# Patient Record
Sex: MALE | URBAN - METROPOLITAN AREA
[De-identification: names, ages, dates, MRNs, and addresses within clinical notes are randomized per-mention and may not be internally consistent; named-entity substitution may affect disease eponyms.]

---

## 2022-05-13 ENCOUNTER — OFFICE VISIT (OUTPATIENT)
Dept: URBAN - METROPOLITAN AREA CLINIC 113 | Facility: CLINIC | Age: 52
End: 2022-05-13
Payer: SELF-PAY

## 2022-05-13 ENCOUNTER — LAB OUTSIDE AN ENCOUNTER (OUTPATIENT)
Dept: URBAN - METROPOLITAN AREA CLINIC 113 | Facility: CLINIC | Age: 52
End: 2022-05-13

## 2022-05-13 VITALS
BODY MASS INDEX: 27.11 KG/M2 | SYSTOLIC BLOOD PRESSURE: 127 MMHG | TEMPERATURE: 97.8 F | WEIGHT: 183 LBS | DIASTOLIC BLOOD PRESSURE: 81 MMHG | HEIGHT: 69 IN | HEART RATE: 53 BPM

## 2022-05-13 DIAGNOSIS — Z80.0 FAMILY HISTORY OF STOMACH CANCER: ICD-10-CM

## 2022-05-13 DIAGNOSIS — R10.13 EPIGASTRIC PAIN: ICD-10-CM

## 2022-05-13 PROCEDURE — 99244 OFF/OP CNSLTJ NEW/EST MOD 40: CPT | Performed by: INTERNAL MEDICINE

## 2022-05-13 PROCEDURE — 99204 OFFICE O/P NEW MOD 45 MIN: CPT | Performed by: INTERNAL MEDICINE

## 2022-05-13 RX ORDER — PANTOPRAZOLE SODIUM 40 MG/1
1 TABLET TABLET, DELAYED RELEASE ORAL ONCE A DAY
Status: ON HOLD | COMMUNITY

## 2022-05-13 RX ORDER — PANTOPRAZOLE SODIUM 40 MG/1
1 TABLET TABLET, DELAYED RELEASE ORAL ONCE A DAY
Qty: 90 TABLET | Refills: 2 | OUTPATIENT
Start: 2022-05-13

## 2022-05-13 RX ORDER — DEXTROMETHORPHAN HYDROBROMIDE AND PROMETHAZINE HYDROCHLORIDE 15; 6.25 MG/5ML; MG/5ML
5 ML AS NEEDED SYRUP ORAL
Status: ON HOLD | COMMUNITY

## 2022-05-13 NOTE — HPI-TODAY'S VISIT:
52 yo referred by Dr. Travis Oviedo for epigastric abdominal pain. A copy of today's visit will be forwarded to the referring provider.  Labs 12/22/21: PSA 0.61, A1c 5.3, TSH 2.249, WBC 6.47, H/H 14.5/43.5, MCV 91.8, Plt 172, Chol 199, Trig 100, HDL 59, , BUN 20, Crt 1.1, Na 138. K 3.9, Tbili 0.9, AST 25, ALT 40, ALP 67.  Abdominal US on 12/29/21 revealed a normal pancreas, a 1.4 cm echogenic mass in the left hepatic lobe likely representing a benign hemangioma and a 1.2 cm hepatic cyst in the right hepatic lobe, normal appearing gall bladder.  He is Wolof speaking. A  was offered to the patient, however, he prefers to use his [place] in [place] as his .   He tells me that he has been having abdominal cramping, and a sensation of chronic abdominal bubbling. This can occur on both an empty stomach or full stomach. This is not really painful as much as it is annoying. He tells me that the stomach cramping occurs 2 or 3 times per week, usually after the dinner time meal. There is no heartburn or dysphagia. HE describes a sensation of a foul taste in the mouth in the mornings. No nausea or vomiting. He stopped pantoprazole last week due to exhausting his supply. He did notice mild improvement in his abdominal complaints with pantoprazole. He has bowel movements usually once daily. There is no red blood per rectum or melena. He can have some bloating, which can imprve with passing gas or having a bowel movement. There has been no weight loss. There is not any early satiety. He has noted that eating pasta and saucy foods make him feel more bloated than others.

## 2022-05-20 ENCOUNTER — OFFICE VISIT (OUTPATIENT)
Dept: URBAN - METROPOLITAN AREA SURGERY CENTER 25 | Facility: SURGERY CENTER | Age: 52
End: 2022-05-20

## 2022-05-20 ENCOUNTER — CLAIMS CREATED FROM THE CLAIM WINDOW (OUTPATIENT)
Dept: URBAN - METROPOLITAN AREA SURGERY CENTER 25 | Facility: SURGERY CENTER | Age: 52
End: 2022-05-20
Payer: SELF-PAY

## 2022-05-20 ENCOUNTER — CLAIMS CREATED FROM THE CLAIM WINDOW (OUTPATIENT)
Dept: URBAN - METROPOLITAN AREA CLINIC 4 | Facility: CLINIC | Age: 52
End: 2022-05-20
Payer: SELF-PAY

## 2022-05-20 DIAGNOSIS — Z80.0: ICD-10-CM

## 2022-05-20 DIAGNOSIS — B96.81 HELICOBACTER PYLORI [H. PYLORI] AS THE CAUSE OF DISEASES CLASSIFIED ELSEWHERE: ICD-10-CM

## 2022-05-20 DIAGNOSIS — B96.81 H PYLORI +: ICD-10-CM

## 2022-05-20 DIAGNOSIS — K29.60 OTHER GASTRITIS WITHOUT HEMORRHAGE, UNSPECIFIED CHRONICITY: ICD-10-CM

## 2022-05-20 DIAGNOSIS — K29.60 OTHER GASTRITIS WITHOUT BLEEDING: ICD-10-CM

## 2022-05-20 PROCEDURE — 43239 EGD BIOPSY SINGLE/MULTIPLE: CPT | Performed by: INTERNAL MEDICINE

## 2022-05-20 PROCEDURE — 88305 TISSUE EXAM BY PATHOLOGIST: CPT | Performed by: PATHOLOGY

## 2022-05-20 PROCEDURE — 88312 SPECIAL STAINS GROUP 1: CPT | Performed by: PATHOLOGY

## 2022-05-20 PROCEDURE — G8907 PT DOC NO EVENTS ON DISCHARG: HCPCS | Performed by: INTERNAL MEDICINE

## 2022-05-20 RX ORDER — DEXTROMETHORPHAN HYDROBROMIDE AND PROMETHAZINE HYDROCHLORIDE 15; 6.25 MG/5ML; MG/5ML
5 ML AS NEEDED SYRUP ORAL
Status: ON HOLD | COMMUNITY

## 2022-05-20 RX ORDER — PANTOPRAZOLE SODIUM 40 MG/1
1 TABLET TABLET, DELAYED RELEASE ORAL ONCE A DAY
Status: ON HOLD | COMMUNITY

## 2022-05-20 RX ORDER — PANTOPRAZOLE SODIUM 40 MG/1
1 TABLET TABLET, DELAYED RELEASE ORAL ONCE A DAY
Qty: 90 TABLET | Refills: 2 | Status: ACTIVE | COMMUNITY
Start: 2022-05-13

## 2022-07-01 ENCOUNTER — OFFICE VISIT (OUTPATIENT)
Dept: URBAN - METROPOLITAN AREA CLINIC 113 | Facility: CLINIC | Age: 52
End: 2022-07-01
Payer: SELF-PAY

## 2022-07-01 VITALS
SYSTOLIC BLOOD PRESSURE: 127 MMHG | WEIGHT: 184 LBS | DIASTOLIC BLOOD PRESSURE: 76 MMHG | HEIGHT: 69 IN | HEART RATE: 54 BPM | TEMPERATURE: 97.8 F | BODY MASS INDEX: 27.25 KG/M2

## 2022-07-01 DIAGNOSIS — A04.8 H. PYLORI INFECTION: ICD-10-CM

## 2022-07-01 DIAGNOSIS — Z80.0 FAMILY HISTORY OF STOMACH CANCER: ICD-10-CM

## 2022-07-01 DIAGNOSIS — R10.13 EPIGASTRIC PAIN: ICD-10-CM

## 2022-07-01 PROCEDURE — 99213 OFFICE O/P EST LOW 20 MIN: CPT | Performed by: NURSE PRACTITIONER

## 2022-07-01 RX ORDER — METRONIDAZOLE 500 MG/1
1 TABLET TABLET ORAL TWICE A DAY
Qty: 28 TABLET | Refills: 0 | OUTPATIENT
Start: 2022-07-01 | End: 2022-07-15

## 2022-07-01 RX ORDER — DEXTROMETHORPHAN HYDROBROMIDE AND PROMETHAZINE HYDROCHLORIDE 15; 6.25 MG/5ML; MG/5ML
5 ML AS NEEDED SYRUP ORAL
Status: ON HOLD | COMMUNITY

## 2022-07-01 RX ORDER — BISMUTH SUBSALICYLATE 262 MG/1
2 TABLETS TABLET, CHEWABLE ORAL TWICE DAILY
Qty: 56 | Refills: 0 | OUTPATIENT
Start: 2022-07-01 | End: 2022-07-15

## 2022-07-01 RX ORDER — PANTOPRAZOLE SODIUM 40 MG/1
1 TABLET TABLET, DELAYED RELEASE ORAL ONCE A DAY
Status: ON HOLD | COMMUNITY

## 2022-07-01 RX ORDER — DOXYCYCLINE HYCLATE 100 MG/1
1 TABLET CAPSULE, GELATIN COATED ORAL BID
Qty: 28 TABLET | Refills: 0 | OUTPATIENT
Start: 2022-07-01 | End: 2022-07-15

## 2022-07-01 RX ORDER — PANTOPRAZOLE SODIUM 40 MG/1
1 TABLET TABLET, DELAYED RELEASE ORAL ONCE A DAY
Qty: 90 TABLET | Refills: 2 | Status: ACTIVE | COMMUNITY
Start: 2022-05-13

## 2022-07-01 RX ORDER — PANTOPRAZOLE SODIUM 40 MG/1
1 TABLET TABLET, DELAYED RELEASE ORAL TWICE DAILY
Qty: 28 | Refills: 0 | OUTPATIENT
Start: 2022-07-01

## 2022-07-01 NOTE — HPI-TODAY'S VISIT:
50 yo male with epigastric pain characterized as a bubbling sensation in the abdomine and bloating, in the setting of family history of stomach cancer in his mother and maternal uncle, presenting for follow up after an EGD.  EGD 5/20/22: Normal esophagus, regular Z line 40 cm from the incisors, erythematous gastric mucosa s/p biopsies, and normal examined duodenum. Stomach biopsies revealed chronic H. pylori gastritis with identification of H. pylori organisms; no intestinal metaplasia, dysplasia or malignancy.

## 2022-10-07 ENCOUNTER — OFFICE VISIT (OUTPATIENT)
Dept: URBAN - METROPOLITAN AREA CLINIC 113 | Facility: CLINIC | Age: 52
End: 2022-10-07
Payer: SELF-PAY

## 2022-10-07 VITALS
HEART RATE: 51 BPM | TEMPERATURE: 97.2 F | HEIGHT: 69 IN | SYSTOLIC BLOOD PRESSURE: 128 MMHG | BODY MASS INDEX: 26.07 KG/M2 | WEIGHT: 176 LBS | DIASTOLIC BLOOD PRESSURE: 80 MMHG

## 2022-10-07 DIAGNOSIS — R14.3 EXCESSIVE GAS: ICD-10-CM

## 2022-10-07 DIAGNOSIS — A04.8 H. PYLORI INFECTION: ICD-10-CM

## 2022-10-07 DIAGNOSIS — Z12.11 SCREENING FOR COLON CANCER: ICD-10-CM

## 2022-10-07 PROCEDURE — 99214 OFFICE O/P EST MOD 30 MIN: CPT | Performed by: NURSE PRACTITIONER

## 2022-10-07 RX ORDER — PANTOPRAZOLE SODIUM 40 MG/1
1 TABLET TABLET, DELAYED RELEASE ORAL TWICE DAILY
Qty: 28 | Refills: 0 | Status: ON HOLD | COMMUNITY
Start: 2022-07-01

## 2022-10-07 RX ORDER — PANTOPRAZOLE SODIUM 40 MG/1
1 TABLET TABLET, DELAYED RELEASE ORAL ONCE A DAY
Status: ON HOLD | COMMUNITY

## 2022-10-07 RX ORDER — PANTOPRAZOLE SODIUM 40 MG/1
1 TABLET TABLET, DELAYED RELEASE ORAL ONCE A DAY
Qty: 90 TABLET | Refills: 2 | Status: ON HOLD | COMMUNITY
Start: 2022-05-13

## 2022-10-07 RX ORDER — DEXTROMETHORPHAN HYDROBROMIDE AND PROMETHAZINE HYDROCHLORIDE 15; 6.25 MG/5ML; MG/5ML
5 ML AS NEEDED SYRUP ORAL
Status: ON HOLD | COMMUNITY

## 2022-10-07 NOTE — HPI-TODAY'S VISIT:
50yo male presenting for follow-up regarding H pylori. A Occitan  was utilized for the visit.  He was seen in the office in July for follow-up after EGD, which revealed H pylori gastritis. He was recommended treatment with quadruple therapy with doxycycline, metronidazole, bismuth, and pantoprazole.  He completed the antibiotics as prescribed. He continues to experience intermittent exacerbations of midabdominal gas and cramping, which is worsened following consumption of some foods- particularly cheese. The pain is not associated with bowel movements. He denies reflux symptoms, nausea or vomiting. He has two soft, nonbloody stools per day. No constipation or diarrhea. He is not on any medications currently. He has never undergone colon cancer screening with colonoscopy or stool testing. There is no family history of colon cancer.

## 2022-10-07 NOTE — HPI-OTHER HISTORIES
EGD 5/20/22: Normal esophagus, regular Z line 40 cm from the incisors, erythematous gastric mucosa s/p biopsies, and normal examined duodenum. Stomach biopsies revealed chronic H. pylori gastritis with identification of H. pylori organisms; no intestinal metaplasia, dysplasia or malignancy. Abdominal US on 12/29/21 revealed a normal pancreas, a 1.4 cm echogenic mass in the left hepatic lobe likely representing a benign hemangioma and a 1.2 cm hepatic cyst in the right hepatic lobe, normal appearing gallbladder.  Labs 12/22/21: PSA 0.61, A1c 5.3, TSH 2.249, WBC 6.47, H/H 14.5/43.5, MCV 91.8, Plt 172, Chol 199, Trig 100, HDL 59, , BUN 20, Crt 1.1, Na 138. K 3.9, Tbili 0.9, AST 25, ALT 40, ALP 67.

## 2022-10-08 LAB
H PYLORI BREATH TEST: DETECTED
H. PYLORI BREATH TEST: DETECTED
INTERPRETATION: DETECTED

## 2022-10-10 ENCOUNTER — TELEPHONE ENCOUNTER (OUTPATIENT)
Dept: URBAN - METROPOLITAN AREA CLINIC 113 | Facility: CLINIC | Age: 52
End: 2022-10-10

## 2022-10-10 RX ORDER — LANSOPRAZOLE 30 MG/1
1 CAPSULE 30 MINUTES PRIOR TO BREAKFAST AND DINNER CAPSULE, DELAYED RELEASE ORAL TWICE DAILY
Qty: 28 CAPSULES | Refills: 0 | OUTPATIENT

## 2022-10-10 RX ORDER — OMEPRAZOLE MAGNESIUM, AMOXICILLIN AND RIFABUTIN 10; 250; 12.5 MG/1; MG/1; MG/1
4 CAPSULES CAPSULE, DELAYED RELEASE ORAL
Qty: 168 | OUTPATIENT
Start: 2022-10-10 | End: 2022-10-24

## 2022-10-10 RX ORDER — AMOXICILLIN 500 MG/1
2 CAPSULES CAPSULE ORAL TWICE A DAY
Qty: 56 CAPSULE | Refills: 0 | OUTPATIENT
Start: 2022-10-19 | End: 2022-11-02

## 2022-10-10 RX ORDER — CLARITHROMYCIN 500 MG/1
1 TABLET TABLET, FILM COATED ORAL
Qty: 28 TABLET | Refills: 0 | OUTPATIENT
Start: 2022-10-19 | End: 2022-11-02

## 2022-11-28 ENCOUNTER — WEB ENCOUNTER (OUTPATIENT)
Dept: URBAN - METROPOLITAN AREA CLINIC 113 | Facility: CLINIC | Age: 52
End: 2022-11-28

## 2022-11-28 ENCOUNTER — OFFICE VISIT (OUTPATIENT)
Dept: URBAN - METROPOLITAN AREA CLINIC 113 | Facility: CLINIC | Age: 52
End: 2022-11-28
Payer: SELF-PAY

## 2022-11-28 VITALS
SYSTOLIC BLOOD PRESSURE: 151 MMHG | DIASTOLIC BLOOD PRESSURE: 81 MMHG | WEIGHT: 176 LBS | RESPIRATION RATE: 16 BRPM | HEIGHT: 69 IN | TEMPERATURE: 97.7 F | HEART RATE: 67 BPM | BODY MASS INDEX: 26.07 KG/M2

## 2022-11-28 DIAGNOSIS — A04.8 H. PYLORI INFECTION: ICD-10-CM

## 2022-11-28 DIAGNOSIS — R10.31 BILATERAL LOWER ABDOMINAL PAIN: ICD-10-CM

## 2022-11-28 DIAGNOSIS — Z12.11 SCREENING FOR COLON CANCER: ICD-10-CM

## 2022-11-28 PROCEDURE — 99214 OFFICE O/P EST MOD 30 MIN: CPT | Performed by: NURSE PRACTITIONER

## 2022-11-28 RX ORDER — PANTOPRAZOLE SODIUM 40 MG/1
1 TABLET TABLET, DELAYED RELEASE ORAL ONCE A DAY
Qty: 90 TABLET | Refills: 2 | Status: ON HOLD | COMMUNITY
Start: 2022-05-13

## 2022-11-28 RX ORDER — PANTOPRAZOLE SODIUM 40 MG/1
1 TABLET TABLET, DELAYED RELEASE ORAL TWICE DAILY
Qty: 28 | Refills: 0 | Status: ON HOLD | COMMUNITY
Start: 2022-07-01

## 2022-11-28 RX ORDER — DICYCLOMINE HYDROCHLORIDE 10 MG/1
1 CAPSULE CAPSULE ORAL
Qty: 60 | Refills: 1 | OUTPATIENT
Start: 2022-11-28 | End: 2023-01-27

## 2022-11-28 RX ORDER — DEXTROMETHORPHAN HYDROBROMIDE AND PROMETHAZINE HYDROCHLORIDE 15; 6.25 MG/5ML; MG/5ML
5 ML AS NEEDED SYRUP ORAL
Status: ON HOLD | COMMUNITY

## 2022-11-28 RX ORDER — LANSOPRAZOLE 30 MG/1
1 CAPSULE 30 MINUTES PRIOR TO BREAKFAST AND DINNER CAPSULE, DELAYED RELEASE ORAL TWICE DAILY
Qty: 28 CAPSULES | Refills: 0 | Status: ACTIVE | COMMUNITY

## 2022-11-28 RX ORDER — PANTOPRAZOLE SODIUM 40 MG/1
1 TABLET TABLET, DELAYED RELEASE ORAL ONCE A DAY
Status: ON HOLD | COMMUNITY

## 2022-11-28 NOTE — HPI-TODAY'S VISIT:
52yo male with a history of H pylori presenting for follow-up. A Armenian  was utilized for the visit.  He was seen in the office 10/7/22 for follow-up regarding H pylori s/p treatment with quadruple therapy with doxycycline, metronidazole, bismuth, and pantoprazole. A H pylori breath test was planned to document eradication. Regarding excess gas, suspected to be related to lactose intolerance, he was encouraged a daily probiotic and dairy holiday. Stool DNA testing was discussed for colon cancer screening, given financial barriers related to lack of medical coverage. H pylori breath test 10/7/22 was positive. He was recommended treatment with Talicia, but the medication was cost-prohibitive. He was sent in [facility], alternatively. He completed the antibiotics earlier this month. After some consideration, he has elected to proceed with a colonoscopy for colon cancer screening opposed to stool DNA testing. He complains of occasional cramping lower abdominal pain, excess gas and stomach "grumbling," which is not related to meals but seems to be worsened at night. He minimized some dairy but continues to drink milk; he does not think this is related. He denies associated nausea or vomiting. He has two nonbloody stools per day.

## 2022-12-20 ENCOUNTER — TELEPHONE ENCOUNTER (OUTPATIENT)
Dept: URBAN - METROPOLITAN AREA CLINIC 113 | Facility: CLINIC | Age: 52
End: 2022-12-20

## 2022-12-21 ENCOUNTER — OFFICE VISIT (OUTPATIENT)
Dept: URBAN - METROPOLITAN AREA SURGERY CENTER 25 | Facility: SURGERY CENTER | Age: 52
End: 2022-12-21

## 2023-01-05 ENCOUNTER — OFFICE VISIT (OUTPATIENT)
Dept: URBAN - METROPOLITAN AREA CLINIC 113 | Facility: CLINIC | Age: 53
End: 2023-01-05

## 2023-01-18 ENCOUNTER — CLAIMS CREATED FROM THE CLAIM WINDOW (OUTPATIENT)
Dept: URBAN - METROPOLITAN AREA CLINIC 4 | Facility: CLINIC | Age: 53
End: 2023-01-18
Payer: SELF-PAY

## 2023-01-18 ENCOUNTER — CLAIMS CREATED FROM THE CLAIM WINDOW (OUTPATIENT)
Dept: URBAN - METROPOLITAN AREA SURGERY CENTER 25 | Facility: SURGERY CENTER | Age: 53
End: 2023-01-18
Payer: SELF-PAY

## 2023-01-18 ENCOUNTER — CLAIMS CREATED FROM THE CLAIM WINDOW (OUTPATIENT)
Dept: URBAN - METROPOLITAN AREA SURGERY CENTER 25 | Facility: SURGERY CENTER | Age: 53
End: 2023-01-18

## 2023-01-18 DIAGNOSIS — Z80.0 FAMILY HISTORY OF STOMACH CANCER: ICD-10-CM

## 2023-01-18 DIAGNOSIS — D12.0 ADENOMA OF CECUM: ICD-10-CM

## 2023-01-18 DIAGNOSIS — D12.3 ADENOMA OF TRANSVERSE COLON: ICD-10-CM

## 2023-01-18 DIAGNOSIS — Z12.11 COLON CANCER SCREENING: ICD-10-CM

## 2023-01-18 DIAGNOSIS — D12.0 BENIGN NEOPLASM OF CECUM: ICD-10-CM

## 2023-01-18 DIAGNOSIS — D12.2 BENIGN NEOPLASM OF ASCENDING COLON: ICD-10-CM

## 2023-01-18 PROCEDURE — 88305 TISSUE EXAM BY PATHOLOGIST: CPT | Performed by: PATHOLOGY

## 2023-01-18 PROCEDURE — 45385 COLONOSCOPY W/LESION REMOVAL: CPT | Performed by: INTERNAL MEDICINE

## 2023-01-18 PROCEDURE — G8907 PT DOC NO EVENTS ON DISCHARG: HCPCS | Performed by: INTERNAL MEDICINE

## 2023-01-18 RX ORDER — PANTOPRAZOLE SODIUM 40 MG/1
1 TABLET TABLET, DELAYED RELEASE ORAL ONCE A DAY
Qty: 90 TABLET | Refills: 2 | Status: ON HOLD | COMMUNITY
Start: 2022-05-13

## 2023-01-18 RX ORDER — DICYCLOMINE HYDROCHLORIDE 10 MG/1
1 CAPSULE CAPSULE ORAL
Qty: 60 | Refills: 1 | Status: ACTIVE | COMMUNITY
Start: 2022-11-28 | End: 2023-01-27

## 2023-01-18 RX ORDER — PANTOPRAZOLE SODIUM 40 MG/1
1 TABLET TABLET, DELAYED RELEASE ORAL ONCE A DAY
Status: ON HOLD | COMMUNITY

## 2023-01-18 RX ORDER — LANSOPRAZOLE 30 MG/1
1 CAPSULE 30 MINUTES PRIOR TO BREAKFAST AND DINNER CAPSULE, DELAYED RELEASE ORAL TWICE DAILY
Qty: 28 CAPSULES | Refills: 0 | Status: ACTIVE | COMMUNITY

## 2023-01-18 RX ORDER — PANTOPRAZOLE SODIUM 40 MG/1
1 TABLET TABLET, DELAYED RELEASE ORAL TWICE DAILY
Qty: 28 | Refills: 0 | Status: ON HOLD | COMMUNITY
Start: 2022-07-01

## 2023-01-18 RX ORDER — DEXTROMETHORPHAN HYDROBROMIDE AND PROMETHAZINE HYDROCHLORIDE 15; 6.25 MG/5ML; MG/5ML
5 ML AS NEEDED SYRUP ORAL
Status: ON HOLD | COMMUNITY

## 2023-02-06 ENCOUNTER — OFFICE VISIT (OUTPATIENT)
Dept: URBAN - METROPOLITAN AREA CLINIC 113 | Facility: CLINIC | Age: 53
End: 2023-02-06
Payer: SELF-PAY

## 2023-02-06 VITALS
DIASTOLIC BLOOD PRESSURE: 94 MMHG | BODY MASS INDEX: 26.66 KG/M2 | HEIGHT: 69 IN | SYSTOLIC BLOOD PRESSURE: 143 MMHG | TEMPERATURE: 97 F | WEIGHT: 180 LBS | RESPIRATION RATE: 55 BRPM

## 2023-02-06 DIAGNOSIS — K57.30 DIVERTICULOSIS LARGE INTESTINE W/O PERFORATION OR ABSCESS W/O BLEEDING: ICD-10-CM

## 2023-02-06 DIAGNOSIS — R10.31 BILATERAL LOWER ABDOMINAL PAIN: ICD-10-CM

## 2023-02-06 DIAGNOSIS — A04.8 H. PYLORI INFECTION: ICD-10-CM

## 2023-02-06 DIAGNOSIS — D12.6 SERRATED ADENOMA OF COLON: ICD-10-CM

## 2023-02-06 PROBLEM — 428054006: Status: ACTIVE | Noted: 2023-02-05

## 2023-02-06 PROBLEM — 724538004: Status: ACTIVE | Noted: 2023-02-05

## 2023-02-06 PROBLEM — 721730009: Status: ACTIVE | Noted: 2023-02-05

## 2023-02-06 PROBLEM — 301754002 RIGHT LOWER QUADRANT PAIN: Status: ACTIVE | Noted: 2023-02-05

## 2023-02-06 PROCEDURE — 99213 OFFICE O/P EST LOW 20 MIN: CPT | Performed by: INTERNAL MEDICINE

## 2023-02-06 RX ORDER — PANTOPRAZOLE SODIUM 40 MG/1
1 TABLET TABLET, DELAYED RELEASE ORAL TWICE DAILY
Qty: 28 | Refills: 0 | Status: ON HOLD | COMMUNITY
Start: 2022-07-01

## 2023-02-06 RX ORDER — PANTOPRAZOLE SODIUM 40 MG/1
1 TABLET TABLET, DELAYED RELEASE ORAL ONCE A DAY
Status: ON HOLD | COMMUNITY

## 2023-02-06 RX ORDER — DEXTROMETHORPHAN HYDROBROMIDE AND PROMETHAZINE HYDROCHLORIDE 15; 6.25 MG/5ML; MG/5ML
5 ML AS NEEDED SYRUP ORAL
Status: ON HOLD | COMMUNITY

## 2023-02-06 RX ORDER — LANSOPRAZOLE 30 MG/1
1 CAPSULE 30 MINUTES PRIOR TO BREAKFAST AND DINNER CAPSULE, DELAYED RELEASE ORAL TWICE DAILY
Qty: 28 CAPSULES | Refills: 0 | Status: ON HOLD | COMMUNITY

## 2023-02-06 RX ORDER — PANTOPRAZOLE SODIUM 40 MG/1
1 TABLET TABLET, DELAYED RELEASE ORAL ONCE A DAY
Qty: 90 TABLET | Refills: 2 | Status: ON HOLD | COMMUNITY
Start: 2022-05-13

## 2023-02-06 NOTE — HPI-OTHER HISTORIES
Colonoscopy on 1/18/2023 revealed normal terminal ileum, many left and transverse colon diverticuli.  There were 2 adenomatous, sessile serrated adenomatous polyps in the ascending colon and cecum each 5 mm in size.  There was a 7 mm sessile serrated adenoma in the transverse colon.  EGD 5/20/22: Normal esophagus, regular Z line 40 cm from the incisors, erythematous gastric mucosa s/p biopsies, and normal examined duodenum. Stomach biopsies revealed chronic H. pylori gastritis with identification of H. pylori organisms; no intestinal metaplasia, dysplasia or malignancy. Abdominal US on 12/29/21 revealed a normal pancreas, a 1.4 cm echogenic mass in the left hepatic lobe likely representing a benign hemangioma and a 1.2 cm hepatic cyst in the right hepatic lobe, normal appearing gallbladder.  Labs 12/22/21: PSA 0.61, A1c 5.3, TSH 2.249, WBC 6.47, H/H 14.5/43.5, MCV 91.8, Plt 172, Chol 199, Trig 100, HDL 59, , BUN 20, Crt 1.1, Na 138. K 3.9, Tbili 0.9, AST 25, ALT 40, ALP 67.

## 2023-02-06 NOTE — HPI-TODAY'S VISIT:
51 yo male with a history of H pylori presenting for follow-up after colonoscopy examination.  A Tajik  was utilized for the visit.  He was seen in the office 10/7/22 for follow-up regarding H pylori s/p treatment with quadruple therapy with doxycycline, metronidazole, bismuth, and pantoprazole. A H pylori breath test was planned to document eradication. Regarding excess gas, suspected to be related to lactose intolerance, he was encouraged a daily probiotic and dairy holiday. Stool DNA testing was discussed for colon cancer screening, given financial barriers related to lack of medical coverage. H pylori breath test 10/7/22 was positive. He was recommended treatment with Talicia, but the medication was cost-prohibitive. He was sent in [facility], alternatively.  He completed the antibiotics.  He is doing well and has really no complaints.  This no abdominal pain, heartburn or dysphagia.  He is no longer on proton pump inhibitors but does take a medication for gas as needed.  There is no nausea or vomiting or weight loss.  She has 2 bowel movements per day.  This been no blood or melena.  He confirms that his mother had stomach cancer but not colon cancer.

## 2023-02-08 LAB
H PYLORI BREATH TEST: NOT DETECTED
H. PYLORI BREATH TEST: NOT DETECTED
INTERPRETATION: NOT DETECTED

## 2023-04-12 ENCOUNTER — OFFICE VISIT (OUTPATIENT)
Dept: URBAN - METROPOLITAN AREA CLINIC 113 | Facility: CLINIC | Age: 53
End: 2023-04-12

## 2023-05-25 ENCOUNTER — DASHBOARD ENCOUNTERS (OUTPATIENT)
Age: 53
End: 2023-05-25

## 2023-05-25 ENCOUNTER — CLAIMS CREATED FROM THE CLAIM WINDOW (OUTPATIENT)
Dept: URBAN - METROPOLITAN AREA CLINIC 113 | Facility: CLINIC | Age: 53
End: 2023-05-25
Payer: SELF-PAY

## 2023-05-25 VITALS
DIASTOLIC BLOOD PRESSURE: 89 MMHG | BODY MASS INDEX: 27.55 KG/M2 | HEIGHT: 69 IN | SYSTOLIC BLOOD PRESSURE: 141 MMHG | HEART RATE: 52 BPM | WEIGHT: 186 LBS | TEMPERATURE: 97.3 F

## 2023-05-25 DIAGNOSIS — K57.30 DIVERTICULOSIS LARGE INTESTINE W/O PERFORATION OR ABSCESS W/O BLEEDING: ICD-10-CM

## 2023-05-25 DIAGNOSIS — D12.6 SERRATED ADENOMA OF COLON: ICD-10-CM

## 2023-05-25 DIAGNOSIS — A04.8 H. PYLORI INFECTION: ICD-10-CM

## 2023-05-25 PROCEDURE — 99212 OFFICE O/P EST SF 10 MIN: CPT | Performed by: INTERNAL MEDICINE

## 2023-05-25 RX ORDER — DEXTROMETHORPHAN HYDROBROMIDE AND PROMETHAZINE HYDROCHLORIDE 15; 6.25 MG/5ML; MG/5ML
5 ML AS NEEDED SYRUP ORAL
Status: ON HOLD | COMMUNITY

## 2023-05-25 RX ORDER — PANTOPRAZOLE SODIUM 40 MG/1
1 TABLET TABLET, DELAYED RELEASE ORAL TWICE DAILY
Qty: 28 | Refills: 0 | Status: ON HOLD | COMMUNITY
Start: 2022-07-01

## 2023-05-25 RX ORDER — LANSOPRAZOLE 30 MG/1
1 CAPSULE 30 MINUTES PRIOR TO BREAKFAST AND DINNER CAPSULE, DELAYED RELEASE ORAL TWICE DAILY
Qty: 28 CAPSULES | Refills: 0 | Status: ON HOLD | COMMUNITY

## 2023-05-25 RX ORDER — PANTOPRAZOLE SODIUM 40 MG/1
1 TABLET TABLET, DELAYED RELEASE ORAL ONCE A DAY
Status: ON HOLD | COMMUNITY

## 2023-05-25 RX ORDER — PANTOPRAZOLE SODIUM 40 MG/1
1 TABLET TABLET, DELAYED RELEASE ORAL ONCE A DAY
Qty: 90 TABLET | Refills: 2 | Status: ON HOLD | COMMUNITY
Start: 2022-05-13

## 2023-05-25 NOTE — HPI-TODAY'S VISIT:
53 yo male with a history of H pylori presenting for follow-up.  A Slovenian  was utilized for the visit.  He was seen in the office 10/7/22 for follow-up regarding H pylori s/p treatment with quadruple therapy with doxycycline, metronidazole, bismuth, and pantoprazole. Regarding excess gas, suspected to be related to lactose intolerance, he was encouraged a daily probiotic and dairy holiday.  H pylori breath test 10/7/22 was positive. He was recommended treatment with Talicia, but the medication was cost-prohibitive. He was sent in [facility], [facility].  He completed the antibiotics.  Repeat Helicobacter pylori breath test on 2/6/2023 was negative.  He is doing well and has really no GI complaints.  There's been no abdominal pain, heartburn, dysphagia.  There is no nausea or vomiting or weight loss.  He does have a bowel movement every day there's been no blood or melena.  Overall he feels well.  He confirms that his mother had stomach cancer but not colon cancer.  He is no longer taking PPI medications.  He is not on any medications currently.

## 2024-09-13 ENCOUNTER — OFFICE VISIT (OUTPATIENT)
Dept: URBAN - METROPOLITAN AREA CLINIC 113 | Facility: CLINIC | Age: 54
End: 2024-09-13
Payer: SELF-PAY

## 2024-09-13 ENCOUNTER — LAB OUTSIDE AN ENCOUNTER (OUTPATIENT)
Dept: URBAN - METROPOLITAN AREA CLINIC 113 | Facility: CLINIC | Age: 54
End: 2024-09-13

## 2024-09-13 VITALS
BODY MASS INDEX: 26.01 KG/M2 | SYSTOLIC BLOOD PRESSURE: 139 MMHG | HEART RATE: 52 BPM | WEIGHT: 175.6 LBS | RESPIRATION RATE: 12 BRPM | HEIGHT: 69 IN | DIASTOLIC BLOOD PRESSURE: 88 MMHG | TEMPERATURE: 97.1 F

## 2024-09-13 DIAGNOSIS — R10.84 GENERALIZED ABDOMINAL PAIN: ICD-10-CM

## 2024-09-13 DIAGNOSIS — D12.6 SERRATED ADENOMA OF COLON: ICD-10-CM

## 2024-09-13 DIAGNOSIS — K57.30 DIVERTICULOSIS LARGE INTESTINE W/O PERFORATION OR ABSCESS W/O BLEEDING: ICD-10-CM

## 2024-09-13 DIAGNOSIS — A04.8 H. PYLORI INFECTION: ICD-10-CM

## 2024-09-13 PROBLEM — 102614006: Status: ACTIVE | Noted: 2024-09-13

## 2024-09-13 PROCEDURE — 99213 OFFICE O/P EST LOW 20 MIN: CPT | Performed by: INTERNAL MEDICINE

## 2024-09-13 RX ORDER — PANTOPRAZOLE SODIUM 40 MG/1
1 TABLET TABLET, DELAYED RELEASE ORAL TWICE DAILY
Qty: 28 | Refills: 0 | Status: ON HOLD | COMMUNITY
Start: 2022-07-01

## 2024-09-13 RX ORDER — PANTOPRAZOLE SODIUM 40 MG/1
1 TABLET TABLET, DELAYED RELEASE ORAL ONCE A DAY
Qty: 90 TABLET | Refills: 2 | Status: ON HOLD | COMMUNITY
Start: 2022-05-13

## 2024-09-13 RX ORDER — DEXTROMETHORPHAN HYDROBROMIDE AND PROMETHAZINE HYDROCHLORIDE 15; 6.25 MG/5ML; MG/5ML
5 ML AS NEEDED SYRUP ORAL
Status: ON HOLD | COMMUNITY

## 2024-09-13 RX ORDER — LANSOPRAZOLE 30 MG/1
1 CAPSULE 30 MINUTES PRIOR TO BREAKFAST AND DINNER CAPSULE, DELAYED RELEASE ORAL TWICE DAILY
Qty: 28 CAPSULES | Refills: 0 | Status: ON HOLD | COMMUNITY

## 2024-09-13 RX ORDER — PANTOPRAZOLE SODIUM 40 MG/1
1 TABLET TABLET, DELAYED RELEASE ORAL ONCE A DAY
Status: ON HOLD | COMMUNITY

## 2024-09-13 NOTE — HPI-TODAY'S VISIT:
54 yo male with a history of H pylori presenting for follow-up.  A Yakut  was utilized for the visit.  He was seen in the office 10/7/22 for follow-up regarding H pylori s/p treatment with quadruple therapy with doxycycline, metronidazole, bismuth, and pantoprazole. Regarding excess gas, suspected to be related to lactose intolerance, he was encouraged a daily probiotic and dairy holiday.  H pylori breath test 10/7/22 was positive. He was recommended treatment with Talicia, but the medication was cost-prohibitive. He was sent in [facility], [facility].  He completed the antibiotics.  Repeat Helicobacter pylori breath test on 2/6/2023 was negative.  He states that about 30 days ago began having some gas and bloating.  He does eat cheese.  His bowels have altered to every day to every other day rather than twice a day like it is normal.  Sometimes they can be hard.  There is been no blood or melena.  The abdominal pain is generalized and it comes and goes and all over his abdomen he states that it is a stinging pain like a needle prick they can last about half an hour and then go away bowel movements sometimes make it better and eating sometimes makes it better.  He is not clear what makes it worse.  There is been no fevers or chills he denies any stress, NSAIDs use or herbal medications or health food store supplements he currently is on no medications.  He denies any heartburn or dysphagia.  There is been no skin rashes.  Sometimes in the morning he can have a bitter taste in his mouth but there has been no nausea or vomiting.

## 2024-09-16 LAB
(TTG) AB, IGA: <1
(TTG) AB, IGG: <1
A/G RATIO: 1.9
ABSOLUTE BASOPHILS: 21
ABSOLUTE EOSINOPHILS: 109
ABSOLUTE LYMPHOCYTES: 1752
ABSOLUTE MONOCYTES: 385
ABSOLUTE NEUTROPHILS: 2933
ALBUMIN: 4.6
ALKALINE PHOSPHATASE: 47
ALT (SGPT): 28
ANTIGLIADIN ABS, IGA: <1
AST (SGOT): 22
BASOPHILS: 0.4
BILIRUBIN, TOTAL: 1.3
BUN/CREATININE RATIO: 25
BUN: 27
C-REACTIVE PROTEIN, QUANT: <3
CALCIUM: 9.7
CARBON DIOXIDE, TOTAL: 26
CHLORIDE: 103
CREATININE: 1.08
EGFR: 83
EOSINOPHILS: 2.1
GLIADIN (DEAMIDATED) AB (IGG): <1
GLOBULIN, TOTAL: 2.4
GLUCOSE: 86
HEMATOCRIT: 45.3
HEMOGLOBIN: 15
IMMUNOGLOBULIN A: 130
LIPASE: 30
LYMPHOCYTES: 33.7
MCH: 30.3
MCHC: 33.1
MCV: 91.5
MONOCYTES: 7.4
MPV: 10
NEUTROPHILS: 56.4
PLATELET COUNT: 185
POTASSIUM: 4.4
PROTEIN, TOTAL: 7
RDW: 12.8
RED BLOOD CELL COUNT: 4.95
SODIUM: 137
TSH W/REFLEX TO FT4: 1.44
WHITE BLOOD CELL COUNT: 5.2

## 2024-10-25 ENCOUNTER — LAB OUTSIDE AN ENCOUNTER (OUTPATIENT)
Dept: URBAN - METROPOLITAN AREA CLINIC 113 | Facility: CLINIC | Age: 54
End: 2024-10-25

## 2024-10-25 ENCOUNTER — OFFICE VISIT (OUTPATIENT)
Dept: URBAN - METROPOLITAN AREA CLINIC 113 | Facility: CLINIC | Age: 54
End: 2024-10-25
Payer: SELF-PAY

## 2024-10-25 VITALS
DIASTOLIC BLOOD PRESSURE: 84 MMHG | HEIGHT: 69 IN | TEMPERATURE: 97.3 F | SYSTOLIC BLOOD PRESSURE: 134 MMHG | RESPIRATION RATE: 12 BRPM | WEIGHT: 175 LBS | BODY MASS INDEX: 25.92 KG/M2 | HEART RATE: 61 BPM

## 2024-10-25 DIAGNOSIS — D12.6 SERRATED ADENOMA OF COLON: ICD-10-CM

## 2024-10-25 DIAGNOSIS — K57.30 DIVERTICULOSIS LARGE INTESTINE W/O PERFORATION OR ABSCESS W/O BLEEDING: ICD-10-CM

## 2024-10-25 DIAGNOSIS — E27.9 ADRENAL NODULE: ICD-10-CM

## 2024-10-25 DIAGNOSIS — E73.9 LACTOSE INTOLERANCE: ICD-10-CM

## 2024-10-25 DIAGNOSIS — R10.84 GENERALIZED ABDOMINAL PAIN: ICD-10-CM

## 2024-10-25 DIAGNOSIS — A04.8 H. PYLORI INFECTION: ICD-10-CM

## 2024-10-25 PROBLEM — 782415009: Status: ACTIVE | Noted: 2024-10-25

## 2024-10-25 PROBLEM — 237783006: Status: ACTIVE | Noted: 2024-10-25

## 2024-10-25 PROCEDURE — 99213 OFFICE O/P EST LOW 20 MIN: CPT | Performed by: INTERNAL MEDICINE

## 2024-10-25 RX ORDER — PANTOPRAZOLE SODIUM 40 MG/1
1 TABLET TABLET, DELAYED RELEASE ORAL TWICE DAILY
Qty: 28 | Refills: 0 | Status: ON HOLD | COMMUNITY
Start: 2022-07-01

## 2024-10-25 RX ORDER — DEXTROMETHORPHAN HYDROBROMIDE AND PROMETHAZINE HYDROCHLORIDE 15; 6.25 MG/5ML; MG/5ML
5 ML AS NEEDED SYRUP ORAL
Status: ON HOLD | COMMUNITY

## 2024-10-25 RX ORDER — LANSOPRAZOLE 30 MG/1
1 CAPSULE 30 MINUTES PRIOR TO BREAKFAST AND DINNER CAPSULE, DELAYED RELEASE ORAL TWICE DAILY
Qty: 28 CAPSULES | Refills: 0 | Status: ON HOLD | COMMUNITY

## 2024-10-25 RX ORDER — PANTOPRAZOLE SODIUM 40 MG/1
1 TABLET TABLET, DELAYED RELEASE ORAL ONCE A DAY
Status: ON HOLD | COMMUNITY

## 2024-10-25 RX ORDER — PANTOPRAZOLE SODIUM 40 MG/1
1 TABLET TABLET, DELAYED RELEASE ORAL ONCE A DAY
Qty: 90 TABLET | Refills: 2 | Status: ON HOLD | COMMUNITY
Start: 2022-05-13

## 2024-10-25 NOTE — HPI-OTHER HISTORIES
CT scan of abdomen pelvis with contrast on 10/4/2024 revealed a normal spleen and pancreas and gallbladder.  There is scattered hypodensities in the liver too small to characterize likely cysts.  There is a 10 mm cyst in the right liver.  There is an 11 mm nodule in the left adrenal gland.  The prostate is enlarged.  There is no adenopathy.  Colonoscopy on 1/18/2023 revealed normal terminal ileum, many left and transverse colon diverticuli.  There were 2 adenomatous, sessile serrated adenomatous polyps in the ascending colon and cecum each 5 mm in size.  There was a 7 mm sessile serrated adenoma in the transverse colon.  EGD 5/20/22: Normal esophagus, regular Z line 40 cm from the incisors, erythematous gastric mucosa s/p biopsies, and normal examined duodenum. Stomach biopsies revealed chronic H. pylori gastritis with identification of H. pylori organisms; no intestinal metaplasia, dysplasia or malignancy.  Abdominal US on 12/29/21 revealed a normal pancreas, a 1.4 cm echogenic mass in the left hepatic lobe likely representing a benign hemangioma and a 1.2 cm hepatic cyst in the right hepatic lobe, normal appearing gallbladder.  Labs 12/22/21: PSA 0.61, A1c 5.3, TSH 2.249, WBC 6.47, H/H 14.5/43.5, MCV 91.8, Plt 172, Chol 199, Trig 100, HDL 59, , BUN 20, Crt 1.1, Na 138. K 3.9, Tbili 0.9, AST 25, ALT 40, ALP 67.

## 2024-10-25 NOTE — HPI-TODAY'S VISIT:
54 yo male with a history of H pylori presenting for follow-up.  A Indonesian  was utilized for the visit.  Meng  #[id number]  He was seen in the office 10/7/22 for follow-up regarding H pylori s/p treatment with quadruple therapy with doxycycline, metronidazole, bismuth, and pantoprazole. Regarding excess gas, suspected to be related to lactose intolerance, he was encouraged a daily probiotic and dairy holiday.  H pylori breath test 10/7/22 was positive. He was recommended treatment with Talicia, but the medication was cost-prohibitive. He was sent in [facility], [facility].  He completed the antibiotics.  Repeat Helicobacter pylori breath test on 2/6/2023 was negative.  He is doing very well.  He denies any abdominal pain, heartburn, dysphagia.  There is no nausea or vomiting.  He denies any abdominal pain.  He has 1-2 bowel movements per day there is been no blood or melena.  His gaseousness has resolved.  He has found that he is truly lactose intolerant and he tested that by using some lactose products and got worsening gas.  Being off of all lactose products he has no gas problems.  He feels well currently.  Blood work on 9/13/2024 revealed a hemoglobin of 15, WBC of 5.2 and platelet count of 185,000.  Sodium 137 potassium 4.4 BUN 27 creatinine 1.08.  AST 22, ALT 28, alk phosphatase 47, total bili 1.3, albumin 4.6.  Lipase 30, TSH 1.44, CRP is less than 3.  Celiac sprue screen TTG Ig G and IgA negative, deamidated gliadin IgG and IgA negative.  IgA level 130.

## 2025-01-13 ENCOUNTER — OFFICE VISIT (OUTPATIENT)
Dept: URBAN - METROPOLITAN AREA CLINIC 113 | Facility: CLINIC | Age: 55
End: 2025-01-13
Payer: SELF-PAY

## 2025-01-13 VITALS
SYSTOLIC BLOOD PRESSURE: 148 MMHG | HEIGHT: 69 IN | RESPIRATION RATE: 18 BRPM | HEART RATE: 55 BPM | TEMPERATURE: 97.9 F | DIASTOLIC BLOOD PRESSURE: 88 MMHG | BODY MASS INDEX: 27.25 KG/M2 | WEIGHT: 184 LBS

## 2025-01-13 DIAGNOSIS — E27.9 ADRENAL NODULE: ICD-10-CM

## 2025-01-13 DIAGNOSIS — K57.30 DIVERTICULOSIS LARGE INTESTINE W/O PERFORATION OR ABSCESS W/O BLEEDING: ICD-10-CM

## 2025-01-13 DIAGNOSIS — A04.8 H. PYLORI INFECTION: ICD-10-CM

## 2025-01-13 DIAGNOSIS — D12.6 SERRATED ADENOMA OF COLON: ICD-10-CM

## 2025-01-13 DIAGNOSIS — E73.9 LACTOSE INTOLERANCE: ICD-10-CM

## 2025-01-13 DIAGNOSIS — R10.84 GENERALIZED ABDOMINAL PAIN: ICD-10-CM

## 2025-01-13 PROCEDURE — 99213 OFFICE O/P EST LOW 20 MIN: CPT | Performed by: INTERNAL MEDICINE

## 2025-01-13 RX ORDER — LANSOPRAZOLE 30 MG/1
1 CAPSULE 30 MINUTES PRIOR TO BREAKFAST AND DINNER CAPSULE, DELAYED RELEASE ORAL TWICE DAILY
Qty: 28 CAPSULES | Refills: 0 | Status: ON HOLD | COMMUNITY

## 2025-01-13 RX ORDER — PANTOPRAZOLE SODIUM 40 MG/1
1 TABLET TABLET, DELAYED RELEASE ORAL ONCE A DAY
Status: ON HOLD | COMMUNITY

## 2025-01-13 RX ORDER — DEXTROMETHORPHAN HYDROBROMIDE AND PROMETHAZINE HYDROCHLORIDE 15; 6.25 MG/5ML; MG/5ML
5 ML AS NEEDED SYRUP ORAL
Status: ON HOLD | COMMUNITY

## 2025-01-13 RX ORDER — PANTOPRAZOLE SODIUM 40 MG/1
1 TABLET TABLET, DELAYED RELEASE ORAL ONCE A DAY
Qty: 90 TABLET | Refills: 2 | Status: ON HOLD | COMMUNITY
Start: 2022-05-13

## 2025-01-13 RX ORDER — PANTOPRAZOLE SODIUM 40 MG/1
1 TABLET TABLET, DELAYED RELEASE ORAL TWICE DAILY
Qty: 28 | Refills: 0 | Status: ON HOLD | COMMUNITY
Start: 2022-07-01

## 2025-01-13 NOTE — HPI-OTHER HISTORIES
CT scan of abdomen pelvis with and without contrast on 11/14/2024 revealed there is a 14 x 9 mm left adrenal nodule consistent with a left adrenal adenoma.  There is mild bilateral adrenal hyperplasia a 3 mm right adrenal nodule is too small to characterize.  There are multiple liver hypodensities largest being 1.5 cm suggesting nonenhancing simple hepatic cyst.  The prostate is mildly enlarged.  CT scan of abdomen pelvis with contrast on 10/4/2024 revealed a normal spleen and pancreas and gallbladder.  There is scattered hypodensities in the liver too small to characterize likely cysts.  There is a 10 mm cyst in the right liver.  There is an 11 mm nodule in the left adrenal gland.  The prostate is enlarged.  There is no adenopathy.  Colonoscopy on 1/18/2023 revealed normal terminal ileum, many left and transverse colon diverticuli.  There were 2 adenomatous, sessile serrated adenomatous polyps in the ascending colon and cecum each 5 mm in size.  There was a 7 mm sessile serrated adenoma in the transverse colon.  EGD 5/20/22: Normal esophagus, regular Z line 40 cm from the incisors, erythematous gastric mucosa s/p biopsies, and normal examined duodenum. Stomach biopsies revealed chronic H. pylori gastritis with identification of H. pylori organisms; no intestinal metaplasia, dysplasia or malignancy.  Abdominal US on 12/29/21 revealed a normal pancreas, a 1.4 cm echogenic mass in the left hepatic lobe likely representing a benign hemangioma and a 1.2 cm hepatic cyst in the right hepatic lobe, normal appearing gallbladder.  Labs 12/22/21: PSA 0.61, A1c 5.3, TSH 2.249, WBC 6.47, H/H 14.5/43.5, MCV 91.8, Plt 172, Chol 199, Trig 100, HDL 59, , BUN 20, Crt 1.1, Na 138. K 3.9, Tbili 0.9, AST 25, ALT 40, ALP 67.

## 2025-01-13 NOTE — HPI-TODAY'S VISIT:
53 yo male with a history of H pylori presenting for follow-up.  A Slovak  was utilized for the visit.   He was seen in the office 10/7/22 for follow-up regarding H pylori s/p treatment with quadruple therapy with doxycycline, metronidazole, bismuth, and pantoprazole. Regarding excess gas, suspected to be related to lactose intolerance, he was encouraged a daily probiotic and dairy holiday.  H pylori breath test 10/7/22 was positive. He was recommended treatment with Talicia, but the medication was cost-prohibitive. He was sent in [facility], [facility].  He completed the antibiotics.  Repeat Helicobacter pylori breath test on 2/6/2023 was negative.  He is doing very well and has no GI complaints.  He did have his CT scan done by his PCP for his left adrenal adenoma.  He denies any heartburn, dysphagia, abdominal pain.  There is no nausea or vomiting.  He moves his bowels twice a day there is been no blood or melena.  There is no weight loss.  He states he feels well.  Blood work on 9/13/2024 revealed a hemoglobin of 15, WBC of 5.2 and platelet count of 185,000.  Sodium 137 potassium 4.4 BUN 27 creatinine 1.08.  AST 22, ALT 28, alk phosphatase 47, total bili 1.3, albumin 4.6.  Lipase 30, TSH 1.44, CRP is less than 3.  Celiac sprue screen TTG Ig G and IgA negative, deamidated gliadin IgG and IgA negative.  IgA level 130.